# Patient Record
Sex: MALE | Race: BLACK OR AFRICAN AMERICAN | NOT HISPANIC OR LATINO | Employment: FULL TIME | URBAN - METROPOLITAN AREA
[De-identification: names, ages, dates, MRNs, and addresses within clinical notes are randomized per-mention and may not be internally consistent; named-entity substitution may affect disease eponyms.]

---

## 2023-03-27 PROBLEM — I10 ESSENTIAL HYPERTENSION: Status: ACTIVE | Noted: 2023-03-27

## 2023-03-27 RX ORDER — CHLORTHALIDONE 50 MG/1
1 TABLET ORAL DAILY
COMMUNITY
Start: 2017-11-14 | End: 2023-11-21

## 2023-03-30 RX ORDER — DIAZEPAM 5 MG/1
TABLET ORAL
COMMUNITY
End: 2023-11-21

## 2023-03-30 RX ORDER — IBUPROFEN 600 MG/1
TABLET ORAL
COMMUNITY

## 2023-04-01 ENCOUNTER — OFFICE VISIT (OUTPATIENT)
Dept: PRIMARY CARE | Facility: CLINIC | Age: 38
End: 2023-04-01
Payer: COMMERCIAL

## 2023-04-01 VITALS
BODY MASS INDEX: 40.49 KG/M2 | SYSTOLIC BLOOD PRESSURE: 180 MMHG | DIASTOLIC BLOOD PRESSURE: 112 MMHG | HEIGHT: 67 IN | WEIGHT: 258 LBS

## 2023-04-01 DIAGNOSIS — I10 HYPERTENSION, UNSPECIFIED TYPE: ICD-10-CM

## 2023-04-01 PROCEDURE — 3080F DIAST BP >= 90 MM HG: CPT | Performed by: INTERNAL MEDICINE

## 2023-04-01 PROCEDURE — 99204 OFFICE O/P NEW MOD 45 MIN: CPT | Performed by: INTERNAL MEDICINE

## 2023-04-01 PROCEDURE — 3077F SYST BP >= 140 MM HG: CPT | Performed by: INTERNAL MEDICINE

## 2023-04-02 NOTE — PROGRESS NOTES
OFFICE NOTE    NAME OF THE PATIENT: Román Gómez    YOB: 1985    CHIEF COMPLAINT:  This 37-year-old -American gentleman came to my office first time, I welcomed him.  There was no electricity, so we had to work around it.  He told me that he has increasing leg swelling.  He has a high blood pressure.  He was in the emergency room.  They were started him on  losartan and blood pressure still very high, he is concerned.  Palpitation.  Not feeling good.  He came here for follow-up on various conditions.  Legs swell up at the end of the day.  Edema.  He is not sure the reason.    PAST MEDICAL HISTORY:  Reviewed from EMR.  Newly diagnosed high blood pressure, overweight.    CURRENT MEDICATIONS:  Reviewed from EMR.  Losartan.    ALLERGIES:  Reviewed from EMR.  None.    SOCIAL HISTORY:  Reviewed from EMR.  He smokes a few cigarettes.  No history of alcohol or drug abuse.  Occupation, he is a .  Single.  Two children.    FAMILY HISTORY:  Reviewed from EMR.  Positive for hypertension.  High blood pressure in the family. Diabetes in the family.    IMMUNIZATION:  Tetanus within the last 10 years.    OPERATION:  ______.    REVIEW OF SYSTEMS:  No heart attack.  No stroke.  No diabetes.  No cancer.  All 12 systems reviewed and pertaining covered in history and physical.    PHYSICAL EXAMINATION  VITAL SIGNS:  As recorded and reviewed from EMR.  EYES:  The patient's sclerae white.  The pupils were equal and round.  ENT:  The patient's external ears were normal and the otoscopic examination was negative.  NECK:  Supple.  There were no palpable masses.  Thyroid was not enlarged and there were no carotid bruits.  RESPIRATORY:  The patient had normal inspirations and expirations.  The breath sounds were equal bilaterally and clear to auscultation.  CARDIOVASCULAR:  The patient had S1 normal, split S2 without obvious rubs, clicks, or murmurs.  GASTROINTESTINAL:  There was no hepatosplenomegaly.   "There were no palpable masses and no inguinal nodes.  LYMPHATIC:  The patient had no axillary, groin, or lymphadenopathy.  MUSCULOSKELETAL:  The patient had normal gait.  The joints appeared to be normal without evidence of deformity.  Grossly the muscles had good range of motion and were without effusion.  EXTREMITIES:  Legs had 2+ edema.  NEUROLOGIC:  The patient had normal cranial nerves.  The reflexes, sensory, and motor examination were grossly within normal limits.  PSYCHIATRIC:  The patient had normal judgment and insight.  The patient was oriented to person, place, and time, and had no obvious mood defect including depression, anxiety, and/or agitation.    LAB WORK:  Laboratory testing ordered.    ASSESSMENT AND PLAN:  Edema.  It could be because of the hypertension.  I will monitor.  I ordered echocardiogram for heart.   Lasix and potassium given.  Hypertension.  Make losartan 100 mg.  I added amlodipine.  Blood pressure is really high.  Cholesterol.  Cholesterol check ordered.  Overweight and family history of diabetes.  I ordered fasting sugar, hemoglobin A1c.  Palpitations as well as edema, hypertension.  I ordered 2D echo of the heart to check for HOCM, questionable murmur.  Electricity is not here today, power outage.  So I personally called in prescription to his [facility] pharmacy.  Blood work ordered.  I shall see him in a week after testing.      Kindly review this note in conjunction with EMR.   Subjective   Patient ID: Román Gómez is a 37 y.o. male who presents for New Patient Visit.      HPI    Review of Systems    Objective   BP (!) 180/112   Ht 1.689 m (5' 6.5\")   Wt 117 kg (258 lb)   BMI 41.02 kg/m²       Physical Exam    Assessment/Plan   Problem List Items Addressed This Visit    None  Visit Diagnoses       Hypertension, unspecified type                  "

## 2023-05-25 ENCOUNTER — OFFICE VISIT (OUTPATIENT)
Dept: PRIMARY CARE | Facility: CLINIC | Age: 38
End: 2023-05-25
Payer: COMMERCIAL

## 2023-05-25 VITALS — HEIGHT: 67 IN | BODY MASS INDEX: 38.45 KG/M2 | WEIGHT: 245 LBS

## 2023-05-25 DIAGNOSIS — I10 HYPERTENSION, UNSPECIFIED TYPE: ICD-10-CM

## 2023-05-25 DIAGNOSIS — E03.9 HYPOTHYROIDISM, UNSPECIFIED TYPE: ICD-10-CM

## 2023-05-25 DIAGNOSIS — I50.9 CONGESTIVE HEART FAILURE, UNSPECIFIED HF CHRONICITY, UNSPECIFIED HEART FAILURE TYPE (MULTI): ICD-10-CM

## 2023-05-25 DIAGNOSIS — E56.9 VITAMIN DEFICIENCY: ICD-10-CM

## 2023-05-25 DIAGNOSIS — I83.899 COMPLICATED VARICOSE VEINS: ICD-10-CM

## 2023-05-25 PROCEDURE — 99214 OFFICE O/P EST MOD 30 MIN: CPT | Performed by: INTERNAL MEDICINE

## 2023-05-25 RX ORDER — AMLODIPINE BESYLATE 10 MG/1
10 TABLET ORAL DAILY
Qty: 90 TABLET | Refills: 1 | Status: SHIPPED | OUTPATIENT
Start: 2023-05-25

## 2023-05-25 RX ORDER — AMLODIPINE BESYLATE 10 MG/1
10 TABLET ORAL DAILY
COMMUNITY
Start: 2023-04-28 | End: 2023-05-25 | Stop reason: SDUPTHER

## 2023-05-25 RX ORDER — FUROSEMIDE 40 MG/1
40 TABLET ORAL DAILY
COMMUNITY
Start: 2023-04-28 | End: 2023-06-30

## 2023-05-25 RX ORDER — METOPROLOL SUCCINATE 25 MG/1
25 TABLET, EXTENDED RELEASE ORAL DAILY
Qty: 90 TABLET | Refills: 1 | Status: SHIPPED | OUTPATIENT
Start: 2023-05-25 | End: 2023-11-21

## 2023-05-25 RX ORDER — LOSARTAN POTASSIUM 100 MG/1
100 TABLET ORAL DAILY
Qty: 90 TABLET | Refills: 1 | Status: SHIPPED | OUTPATIENT
Start: 2023-05-25 | End: 2023-12-15

## 2023-05-25 RX ORDER — LOSARTAN POTASSIUM 100 MG/1
100 TABLET ORAL DAILY
COMMUNITY
Start: 2023-04-28 | End: 2023-05-25 | Stop reason: SDUPTHER

## 2023-05-25 RX ORDER — POTASSIUM CHLORIDE 1500 MG/1
20 TABLET, EXTENDED RELEASE ORAL DAILY
COMMUNITY
Start: 2023-04-28

## 2023-05-25 NOTE — PROGRESS NOTES
OFFICE NOTE    NAME OF THE PATIENT: Román Gómez     YOB: 1985    CHIEF COMPLAINT:  This gentleman is not happy because there was no paper on blood work, so he did not do blood work.  He does not like his potassium pill, too big.  Back of both legs he he has swelling and tenderness, not getting any better, dilated veins, he gets tingling and numbness.  He came for follow-up on various conditions.  Appetite and weight are okay.  No problem.    PAST MEDICAL HISTORY:  Reviewed on EMR, unchanged.    CURRENT MEDICATIONS:  Reviewed on EMR, unchanged.  According to him, he is taking losartan, furosemide, potassium, amlodipine.    ALLERGIES:  Reviewed on EMR, unchanged.    SOCIAL HISTORY:  Reviewed on EMR, unchanged.  He does not smoke, does not drink alcohol.    FAMILY HISTORY:  Reviewed on EMR, unchanged.    REVIEW OF SYSTEMS:  All 12 systems reviewed and pertaining covered in history and physical.    PHYSICAL EXAMINATION  VITAL SIGNS:  As recorded and reviewed from EMR.  RESPIRATORY:  The patient had normal inspirations and expirations.  The breath sounds were equal bilaterally and clear to auscultation.  CARDIOVASCULAR:  The patient had S1 normal, split S2 without obvious rubs, clicks, or murmurs.    GASTROINTESTINAL:  There was no hepatosplenomegaly.  There were no palpable masses and no inguinal nodes.  EXTREMITIES:  Legs had no edema.  Back of the legs looks like varicose veins.  NEUROLOGIC:  The patient had normal cranial nerves.  The reflexes, sensory, and motor examination were grossly within normal limits.    LAB WORK:  Laboratory testing discussed.    ASSESSMENT AND PLAN:  Varicose veins.  We will do ultrasound to rule out DVT.  Referred to Vascular Medicine, stockings.  Question of heart failure, leg edema.  Ejection fraction ______.  Diastolic dysfunction starting.  Metoprolol started.  Hypertension.  Losartan, amlodipine.  Edema.  Lasix, potassium.  ______ (Pre-diabetic).  Hemoglobin  "A1c.  Arthritis.  Blood work ordered.  I shall see him back in about a week to 10 days after testing.  We will do blood work today.    Kindly review this note in conjunction with EMR.     Subjective   Patient ID: Román Gómez is a 37 y.o. male who presents for Follow-up and Med Refill.      HPI    Review of Systems    Objective   Ht 1.702 m (5' 7\")   Wt 111 kg (245 lb)   BMI 38.37 kg/m²       Physical Exam    Assessment/Plan   Problem List Items Addressed This Visit    None        "

## 2023-06-29 DIAGNOSIS — R60.1 GENERALIZED EDEMA: Primary | ICD-10-CM

## 2023-06-30 RX ORDER — FUROSEMIDE 40 MG/1
TABLET ORAL
Qty: 30 TABLET | Refills: 2 | Status: SHIPPED | OUTPATIENT
Start: 2023-06-30 | End: 2023-07-22

## 2023-07-22 DIAGNOSIS — R60.1 GENERALIZED EDEMA: ICD-10-CM

## 2023-07-22 RX ORDER — FUROSEMIDE 40 MG/1
TABLET ORAL
Qty: 30 TABLET | Refills: 2 | Status: SHIPPED | OUTPATIENT
Start: 2023-07-22 | End: 2023-12-23

## 2023-09-26 PROBLEM — M79.89 LEG SWELLING: Status: ACTIVE | Noted: 2023-09-26

## 2023-09-26 PROBLEM — L73.2 HIDRADENITIS SUPPURATIVA: Status: ACTIVE | Noted: 2018-05-01

## 2023-09-26 PROBLEM — L02.92 FURUNCLE: Status: ACTIVE | Noted: 2023-09-26

## 2023-09-26 PROBLEM — I83.893 SYMPTOMATIC VARICOSE VEINS OF BOTH LOWER EXTREMITIES: Status: ACTIVE | Noted: 2023-09-26

## 2023-09-26 PROBLEM — A63.0 ANOGENITAL (VENEREAL) WARTS: Status: ACTIVE | Noted: 2018-05-01

## 2023-09-26 PROBLEM — L30.9 DERMATITIS: Status: ACTIVE | Noted: 2023-09-26

## 2023-10-02 ENCOUNTER — TELEPHONE (OUTPATIENT)
Dept: DERMATOLOGY | Facility: CLINIC | Age: 38
End: 2023-10-02
Payer: COMMERCIAL

## 2023-10-02 DIAGNOSIS — L30.9 DERMATITIS: Primary | ICD-10-CM

## 2023-10-02 RX ORDER — CLOBETASOL PROPIONATE 0.5 MG/G
CREAM TOPICAL 2 TIMES DAILY PRN
Qty: 60 G | Refills: 1 | Status: SHIPPED | OUTPATIENT
Start: 2023-10-02 | End: 2023-10-16

## 2023-10-02 NOTE — TELEPHONE ENCOUNTER
Patient called left message that he is out of his cream and the amount he is prescribed does not last he needs a larger amount.  Patient states he is only applying once a day to try to make the medication last longer.  Please advise

## 2023-10-31 ENCOUNTER — OFFICE VISIT (OUTPATIENT)
Dept: DERMATOLOGY | Facility: CLINIC | Age: 38
End: 2023-10-31
Payer: COMMERCIAL

## 2023-10-31 DIAGNOSIS — L30.9 DERMATITIS: ICD-10-CM

## 2023-10-31 DIAGNOSIS — L20.9 ATOPIC DERMATITIS, UNSPECIFIED TYPE: Primary | ICD-10-CM

## 2023-10-31 PROCEDURE — 3078F DIAST BP <80 MM HG: CPT | Performed by: NURSE PRACTITIONER

## 2023-10-31 PROCEDURE — 3074F SYST BP LT 130 MM HG: CPT | Performed by: NURSE PRACTITIONER

## 2023-10-31 PROCEDURE — 99213 OFFICE O/P EST LOW 20 MIN: CPT | Performed by: NURSE PRACTITIONER

## 2023-10-31 RX ORDER — TRIAMCINOLONE ACETONIDE 1 MG/G
CREAM TOPICAL 2 TIMES DAILY PRN
Qty: 453.6 G | Refills: 1 | Status: SHIPPED | OUTPATIENT
Start: 2023-10-31 | End: 2024-03-19

## 2023-10-31 RX ORDER — PREDNISONE 10 MG/1
20 TABLET ORAL EVERY MORNING
Qty: 28 TABLET | Refills: 0 | Status: SHIPPED | OUTPATIENT
Start: 2023-10-31 | End: 2023-11-21

## 2023-10-31 ASSESSMENT — DERMATOLOGY QUALITY OF LIFE (QOL) ASSESSMENT
ARE THERE EXCLUSIONS OR EXCEPTIONS FOR THE QUALITY OF LIFE ASSESSMENT: NO
DATE THE QUALITY-OF-LIFE ASSESSMENT WAS COMPLETED: 66778

## 2023-10-31 ASSESSMENT — ITCH NUMERIC RATING SCALE: HOW SEVERE IS YOUR ITCHING?: 6

## 2023-10-31 NOTE — PROGRESS NOTES
Subjective     Román Gómez is a 38 y.o. male who presents for the following: Rash.   Last seen by Debbie anne September 6, 2023 6-week follow-up today for rash affecting hands feet back chest and legs.  Rash has been present for many months patient has a history of atopic dermatitis has used in the past CeraVe lotion Eucerin lotion baby oil patient has a family history of psoriasis patient recently started metoprolol for hypertension and is now well controlled.  At last visit patient had no groin or scalp involvement.  However today patient states that his rash has now begun to spread into the scalp and other areas on the body.  Patient states that the areas on lower legs have cleared up some but clobetasol cream that was prescribed does not last the entire month.  Patient using clobetasol scalp solution as well for new scalp involvement        Review of Systems:  No other skin or systemic complaints other than what is documented elsewhere in the note.    The following portions of the chart were reviewed this encounter and updated as appropriate:       Skin Cancer History  No skin cancer on file.    Specialty Problems          Dermatology Problems    Hidradenitis suppurativa    Dermatitis     Past Medical History:  Román Gómez  has no past medical history on file.    Past Surgical History:  Román Gómez  has no past surgical history on file.    Family History:  Patient family history is not on file.    Social History:  Román Gómez  has no history on file for tobacco use, alcohol use, and drug use.    Allergies:  Patient has no known allergies.    Current Medications / CAM's:    Current Outpatient Medications:     amLODIPine (Norvasc) 10 mg tablet, Take 1 tablet (10 mg) by mouth once daily., Disp: 90 tablet, Rfl: 1    chlorthalidone (Hygroton) 50 mg tablet, Take 1 tablet (50 mg) by mouth once daily., Disp: , Rfl:     diazePAM (Valium) 5 mg tablet, , Disp: , Rfl:     furosemide (Lasix) 40 mg tablet,  TAKE 1 TABLET BY MOUTH EVERY DAY, Disp: 30 tablet, Rfl: 2    ibuprofen 600 mg tablet, , Disp: , Rfl:     losartan (Cozaar) 100 mg tablet, Take 1 tablet (100 mg) by mouth once daily., Disp: 90 tablet, Rfl: 1    metoprolol succinate XL (Toprol-XL) 25 mg 24 hr tablet, Take 1 tablet (25 mg) by mouth once daily. Do not crush or chew., Disp: 90 tablet, Rfl: 1    potassium chloride CR (K-Tab) 20 mEq ER tablet, Take 1 tablet (20 mEq) by mouth once daily., Disp: , Rfl:     predniSONE (Deltasone) 10 mg tablet, Take 2 tablets (20 mg) by mouth once daily in the morning for 14 days., Disp: 28 tablet, Rfl: 0    triamcinolone (Kenalog) 0.1 % cream, Apply topically 2 times a day as needed for rash., Disp: 453.6 g, Rfl: 1     Objective   Well appearing patient in no apparent distress; mood and affect are within normal limits.    A full examination was performed including scalp, head, eyes, ears, nose, lips, neck, chest, axillae, abdomen, back, buttocks, bilateral upper extremities, bilateral lower extremities, hands, feet, fingers, toes, fingernails, and toenails. All findings within normal limits unless otherwise noted below.    Assessment/Plan   1. Atopic dermatitis, unspecified type    Related Medications  triamcinolone (Kenalog) 0.1 % cream  Apply topically 2 times a day as needed for rash.    predniSONE (Deltasone) 10 mg tablet  Take 2 tablets (20 mg) by mouth once daily in the morning for 14 days.    2. Dermatitis  Chest - Medial (Center), Head - Anterior (Face), Left Abdomen (side) - Lower, Left Abdomen (side) - Upper, Left Axilla, Left Breast, Left Hand - Anterior, Left Inframammary Fold, Left Lower Leg - Anterior, Left Upper Arm - Anterior, Neck - Anterior, Right Abdomen (side) - Lower, Right Axilla, Right Breast, Right Inguinal Area, Right Knee - Anterior, Right Lower Leg - Anterior, Right Suprapubic Area, Right Thigh - Anterior, Right Upper Arm - Anterior  Scaly erythematous papules and plaques with edema and vesiculation  in a distribution consistent with contact.  Evidence of open excoriations and postinflammatory hyperpigmentation from previously resolved spots.  Patient is only intermittently using clobetasol 0.05% as well as helpful he runs out of the cream rapidly given his body surface area.  On exam today body surface area estimated approximately 40%.    Plan: Counseling.  I counseled the patient regarding the following:  Skin care: Patient should bathe using lukewarm water with a mild cleanser and moisturize immediately after. Emollients should be applied at least  2-3 times daily. Avoid scented detergents or fabric softeners. Keep fingernai ls short. Avoid excessive hand washing.  Expectations : The patient is aware that eczema is chronic in nature and can improve with moisturizers and topical steroids and worsen with stress,  scented soaps, detergents, scratching, dry skin, changes in weather and skin infections.  Contact office if:  Eczema worsens or fails to improve despite several weeks of treatment; patient develops skin infections (such as:yellow honey  colored crusts or cold sores).    PLAN:  Discussed differential today likely atopic dermatitis given the patient has had a history of eczema his entire life.  We will reconsider biopsy however patient has been treating this rash given its itch with clobetasol over the course of the last several weeks  Plan to start prednisone 20 mg for 2 weeks  Start triamcinolone 0.1% cream twice daily on his body throughout

## 2023-11-21 ENCOUNTER — OFFICE VISIT (OUTPATIENT)
Dept: CARDIOLOGY | Facility: CLINIC | Age: 38
End: 2023-11-21
Payer: COMMERCIAL

## 2023-11-21 ENCOUNTER — LAB (OUTPATIENT)
Dept: LAB | Facility: LAB | Age: 38
End: 2023-11-21
Payer: COMMERCIAL

## 2023-11-21 VITALS
SYSTOLIC BLOOD PRESSURE: 157 MMHG | OXYGEN SATURATION: 95 % | HEIGHT: 67 IN | WEIGHT: 257.2 LBS | BODY MASS INDEX: 40.37 KG/M2 | DIASTOLIC BLOOD PRESSURE: 93 MMHG | HEART RATE: 101 BPM

## 2023-11-21 DIAGNOSIS — I50.9 CONGESTIVE HEART FAILURE, UNSPECIFIED HF CHRONICITY, UNSPECIFIED HEART FAILURE TYPE (MULTI): ICD-10-CM

## 2023-11-21 DIAGNOSIS — I10 HYPERTENSION, UNSPECIFIED TYPE: Primary | ICD-10-CM

## 2023-11-21 DIAGNOSIS — E03.9 HYPOTHYROIDISM, UNSPECIFIED TYPE: ICD-10-CM

## 2023-11-21 DIAGNOSIS — I10 HYPERTENSION, UNSPECIFIED TYPE: ICD-10-CM

## 2023-11-21 DIAGNOSIS — E56.9 VITAMIN DEFICIENCY: ICD-10-CM

## 2023-11-21 LAB
25(OH)D3 SERPL-MCNC: 6 NG/ML (ref 30–100)
ALBUMIN SERPL BCP-MCNC: 4 G/DL (ref 3.4–5)
ALP SERPL-CCNC: 84 U/L (ref 33–120)
ALT SERPL W P-5'-P-CCNC: 32 U/L (ref 10–52)
ANION GAP SERPL CALC-SCNC: 15 MMOL/L (ref 10–20)
APPEARANCE UR: ABNORMAL
AST SERPL W P-5'-P-CCNC: 60 U/L (ref 9–39)
BASOPHILS # BLD AUTO: 0.04 X10*3/UL (ref 0–0.1)
BASOPHILS NFR BLD AUTO: 0.5 %
BILIRUB SERPL-MCNC: 0.3 MG/DL (ref 0–1.2)
BILIRUB UR STRIP.AUTO-MCNC: NEGATIVE MG/DL
BUN SERPL-MCNC: 13 MG/DL (ref 6–23)
CALCIUM SERPL-MCNC: 9.3 MG/DL (ref 8.6–10.6)
CHLORIDE SERPL-SCNC: 107 MMOL/L (ref 98–107)
CHOLEST SERPL-MCNC: 255 MG/DL (ref 0–199)
CHOLESTEROL/HDL RATIO: 3.5
CO2 SERPL-SCNC: 29 MMOL/L (ref 21–32)
COLOR UR: YELLOW
CREAT SERPL-MCNC: 1.1 MG/DL (ref 0.5–1.3)
EOSINOPHIL # BLD AUTO: 0.1 X10*3/UL (ref 0–0.7)
EOSINOPHIL NFR BLD AUTO: 1.2 %
ERYTHROCYTE [DISTWIDTH] IN BLOOD BY AUTOMATED COUNT: 13 % (ref 11.5–14.5)
GFR SERPL CREATININE-BSD FRML MDRD: 88 ML/MIN/1.73M*2
GLUCOSE SERPL-MCNC: 90 MG/DL (ref 74–99)
GLUCOSE UR STRIP.AUTO-MCNC: NEGATIVE MG/DL
HCT VFR BLD AUTO: 41.6 % (ref 41–52)
HDLC SERPL-MCNC: 72.3 MG/DL
HGB BLD-MCNC: 13.7 G/DL (ref 13.5–17.5)
IMM GRANULOCYTES # BLD AUTO: 0.13 X10*3/UL (ref 0–0.7)
IMM GRANULOCYTES NFR BLD AUTO: 1.5 % (ref 0–0.9)
KETONES UR STRIP.AUTO-MCNC: NEGATIVE MG/DL
LDLC SERPL CALC-MCNC: 143 MG/DL
LEUKOCYTE ESTERASE UR QL STRIP.AUTO: NEGATIVE
LYMPHOCYTES # BLD AUTO: 2.76 X10*3/UL (ref 1.2–4.8)
LYMPHOCYTES NFR BLD AUTO: 32.6 %
MCH RBC QN AUTO: 37.3 PG (ref 26–34)
MCHC RBC AUTO-ENTMCNC: 32.9 G/DL (ref 32–36)
MCV RBC AUTO: 113 FL (ref 80–100)
MONOCYTES # BLD AUTO: 0.9 X10*3/UL (ref 0.1–1)
MONOCYTES NFR BLD AUTO: 10.6 %
MUCOUS THREADS #/AREA URNS AUTO: NORMAL /LPF
NEUTROPHILS # BLD AUTO: 4.53 X10*3/UL (ref 1.2–7.7)
NEUTROPHILS NFR BLD AUTO: 53.6 %
NITRITE UR QL STRIP.AUTO: NEGATIVE
NON HDL CHOLESTEROL: 183 MG/DL (ref 0–149)
NRBC BLD-RTO: 0 /100 WBCS (ref 0–0)
PH UR STRIP.AUTO: 5 [PH]
PLATELET # BLD AUTO: 236 X10*3/UL (ref 150–450)
POTASSIUM SERPL-SCNC: 4.1 MMOL/L (ref 3.5–5.3)
PROT SERPL-MCNC: 7.5 G/DL (ref 6.4–8.2)
PROT UR STRIP.AUTO-MCNC: ABNORMAL MG/DL
RBC # BLD AUTO: 3.67 X10*6/UL (ref 4.5–5.9)
RBC # UR STRIP.AUTO: NEGATIVE /UL
RBC #/AREA URNS AUTO: NORMAL /HPF
SODIUM SERPL-SCNC: 147 MMOL/L (ref 136–145)
SP GR UR STRIP.AUTO: 1.02
SQUAMOUS #/AREA URNS AUTO: NORMAL /HPF
TRIGL SERPL-MCNC: 201 MG/DL (ref 0–149)
TSH SERPL-ACNC: 1.14 MIU/L (ref 0.44–3.98)
UROBILINOGEN UR STRIP.AUTO-MCNC: 2 MG/DL
VIT B12 SERPL-MCNC: 230 PG/ML (ref 211–911)
VLDL: 40 MG/DL (ref 0–40)
WBC # BLD AUTO: 8.5 X10*3/UL (ref 4.4–11.3)
WBC #/AREA URNS AUTO: NORMAL /HPF

## 2023-11-21 PROCEDURE — 82306 VITAMIN D 25 HYDROXY: CPT

## 2023-11-21 PROCEDURE — 82607 VITAMIN B-12: CPT

## 2023-11-21 PROCEDURE — 3077F SYST BP >= 140 MM HG: CPT | Performed by: NURSE PRACTITIONER

## 2023-11-21 PROCEDURE — 85025 COMPLETE CBC W/AUTO DIFF WBC: CPT

## 2023-11-21 PROCEDURE — 81001 URINALYSIS AUTO W/SCOPE: CPT

## 2023-11-21 PROCEDURE — 80061 LIPID PANEL: CPT

## 2023-11-21 PROCEDURE — 84443 ASSAY THYROID STIM HORMONE: CPT

## 2023-11-21 PROCEDURE — 3080F DIAST BP >= 90 MM HG: CPT | Performed by: NURSE PRACTITIONER

## 2023-11-21 PROCEDURE — 99214 OFFICE O/P EST MOD 30 MIN: CPT | Performed by: NURSE PRACTITIONER

## 2023-11-21 PROCEDURE — 80053 COMPREHEN METABOLIC PANEL: CPT

## 2023-11-21 PROCEDURE — 36415 COLL VENOUS BLD VENIPUNCTURE: CPT

## 2023-11-21 ASSESSMENT — ENCOUNTER SYMPTOMS
CONSTITUTIONAL NEGATIVE: 1
CARDIOVASCULAR NEGATIVE: 1
OCCASIONAL FEELINGS OF UNSTEADINESS: 0
LOSS OF SENSATION IN FEET: 0
DEPRESSION: 0
MUSCULOSKELETAL NEGATIVE: 1
GASTROINTESTINAL NEGATIVE: 1
RESPIRATORY NEGATIVE: 1
NEUROLOGICAL NEGATIVE: 1

## 2023-11-21 ASSESSMENT — PAIN SCALES - GENERAL: PAINLEVEL: 0-NO PAIN

## 2023-11-21 NOTE — PROGRESS NOTES
"Chief Complaint:   No chief complaint on file.    History Of Present Illness:    .Mr Gómez is here today in follow up.  Denies chest pain, sob, palpitations or pedal edema. He was diagnosed by dermatology with psoriasis and his legs are much better. Has not taken losartan yet today.           Last Recorded Vitals:  Blood pressure (!) 157/93, pulse 101, height 1.702 m (5' 7\"), weight 117 kg (257 lb 3.2 oz), SpO2 95 %.     Past Medical History:  No past medical history on file.     Past Surgical History:  No past surgical history on file.    Social History:  Social History     Socioeconomic History    Marital status: Single     Spouse name: None    Number of children: None    Years of education: None    Highest education level: None   Occupational History    None   Tobacco Use    Smoking status: Every Day     Types: Cigars    Smokeless tobacco: Never   Substance and Sexual Activity    Alcohol use: None    Drug use: None    Sexual activity: None   Other Topics Concern    None   Social History Narrative    None     Social Determinants of Health     Financial Resource Strain: Not on file   Food Insecurity: Not on file   Transportation Needs: Not on file   Physical Activity: Not on file   Stress: Not on file   Social Connections: Not on file   Intimate Partner Violence: Not on file   Housing Stability: Not on file       Family History:  No family history on file.      Allergies:  Patient has no known allergies.    Outpatient Medications:  Current Outpatient Medications   Medication Sig Dispense Refill    amLODIPine (Norvasc) 10 mg tablet Take 1 tablet (10 mg) by mouth once daily. 90 tablet 1    chlorthalidone (Hygroton) 50 mg tablet Take 1 tablet (50 mg) by mouth once daily.      diazePAM (Valium) 5 mg tablet       furosemide (Lasix) 40 mg tablet TAKE 1 TABLET BY MOUTH EVERY DAY 30 tablet 2    ibuprofen 600 mg tablet       losartan (Cozaar) 100 mg tablet Take 1 tablet (100 mg) by mouth once daily. 90 tablet 1    " "metoprolol succinate XL (Toprol-XL) 25 mg 24 hr tablet Take 1 tablet (25 mg) by mouth once daily. Do not crush or chew. 90 tablet 1    potassium chloride CR (K-Tab) 20 mEq ER tablet Take 1 tablet (20 mEq) by mouth once daily.      triamcinolone (Kenalog) 0.1 % cream Apply topically 2 times a day as needed for rash. 453.6 g 1     No current facility-administered medications for this visit.        Physical Exam:  Cardiovascular:      PMI at left midclavicular line. Normal rate. Regular rhythm. Normal S1. Normal S2.       Murmurs: There is no murmur.      No gallop.  No click. No rub.   Pulses:     Intact distal pulses.   Edema:     Peripheral edema absent.         ROS:  Review of Systems   Constitutional: Negative.   Cardiovascular: Negative.    Respiratory: Negative.     Skin: Negative.    Musculoskeletal: Negative.    Gastrointestinal: Negative.    Genitourinary: Negative.    Neurological: Negative.           Last Labs:  CBC -  Lab Results   Component Value Date    WBC 10.5 03/27/2023    HGB 15.4 03/27/2023    HCT 41.9 03/27/2023     (H) 03/27/2023     03/27/2023       CMP -  Lab Results   Component Value Date    CALCIUM 9.4 03/27/2023    PROT 8.2 03/27/2023    ALBUMIN 4.0 03/27/2023    AST 94 (H) 03/27/2023    ALT 53 (H) 03/27/2023    ALKPHOS 93 03/27/2023    BILITOT 0.9 03/27/2023       LIPID PANEL -   No results found for: \"CHOL\", \"TRIG\", \"HDL\", \"CHHDL\", \"LDLF\", \"VLDL\", \"NHDL\"    RENAL FUNCTION PANEL -   Lab Results   Component Value Date    GLUCOSE 77 03/27/2023     03/27/2023    K 3.6 03/27/2023    CL 99 03/27/2023    CO2 26 03/27/2023    ANIONGAP 19 03/27/2023    BUN 8 03/27/2023    CREATININE 0.96 03/27/2023    GFRMALE >90 03/27/2023    CALCIUM 9.4 03/27/2023    ALBUMIN 4.0 03/27/2023        Lab Results   Component Value Date    BNP 87 03/27/2023    HGBA1C 5.8 11/14/2017         Assessment/Plan   Problem List Items Addressed This Visit    None   Hypertension.  Adequately controlled on " current medication.    Echo 04/2023  CONCLUSIONS:  1. Left ventricular systolic function is normal with a 55-60% estimated ejection fraction.  2. Mild to moderate left ventriculary hypertrophy.    Domonique Infante, APRN-CNP

## 2023-11-22 DIAGNOSIS — E78.5 HYPERLIPIDEMIA, UNSPECIFIED HYPERLIPIDEMIA TYPE: Primary | ICD-10-CM

## 2023-11-22 DIAGNOSIS — R06.81 APNEA: Primary | ICD-10-CM

## 2023-12-23 DIAGNOSIS — L21.9 SEBORRHEIC DERMATITIS: Primary | ICD-10-CM

## 2023-12-26 RX ORDER — CLOBETASOL PROPIONATE 0.5 MG/G
CREAM TOPICAL
Qty: 120 G | Refills: 1 | Status: SHIPPED | OUTPATIENT
Start: 2023-12-26

## 2024-03-18 DIAGNOSIS — L20.9 ATOPIC DERMATITIS, UNSPECIFIED TYPE: ICD-10-CM

## 2024-03-18 DIAGNOSIS — R60.1 GENERALIZED EDEMA: ICD-10-CM

## 2024-03-19 RX ORDER — TRIAMCINOLONE ACETONIDE 1 MG/G
CREAM TOPICAL 2 TIMES DAILY PRN
Qty: 454 G | Refills: 1 | Status: SHIPPED | OUTPATIENT
Start: 2024-03-19

## 2024-03-19 RX ORDER — CLOBETASOL PROPIONATE 0.46 MG/ML
SOLUTION TOPICAL
Qty: 50 ML | Refills: 2 | Status: SHIPPED | OUTPATIENT
Start: 2024-03-19

## 2024-03-19 RX ORDER — FUROSEMIDE 40 MG/1
TABLET ORAL
Qty: 90 TABLET | Refills: 0 | OUTPATIENT
Start: 2024-03-19

## 2024-03-28 ENCOUNTER — OFFICE VISIT (OUTPATIENT)
Dept: SLEEP MEDICINE | Facility: CLINIC | Age: 39
End: 2024-03-28
Payer: COMMERCIAL

## 2024-03-28 DIAGNOSIS — R53.83 FATIGUE, UNSPECIFIED TYPE: ICD-10-CM

## 2024-03-28 DIAGNOSIS — G47.10 HYPERSOMNIA: ICD-10-CM

## 2024-03-28 DIAGNOSIS — Z72.821 INADEQUATE SLEEP HYGIENE: ICD-10-CM

## 2024-03-28 DIAGNOSIS — G47.9 SLEEP DISTURBANCE: ICD-10-CM

## 2024-03-28 DIAGNOSIS — F17.200 CURRENT SMOKER: ICD-10-CM

## 2024-03-28 DIAGNOSIS — R06.83 SNORING: ICD-10-CM

## 2024-03-28 DIAGNOSIS — G47.30 SLEEP-DISORDERED BREATHING: Primary | ICD-10-CM

## 2024-03-28 DIAGNOSIS — G47.10 EXCESSIVE SLEEPINESS: ICD-10-CM

## 2024-03-28 DIAGNOSIS — I10 ESSENTIAL HYPERTENSION: ICD-10-CM

## 2024-03-28 DIAGNOSIS — E66.01 MORBID OBESITY (MULTI): ICD-10-CM

## 2024-03-28 PROCEDURE — 99204 OFFICE O/P NEW MOD 45 MIN: CPT

## 2024-03-28 NOTE — PATIENT INSTRUCTIONS
It was a pleasure meeting you today Román Gómez     As we discussed today in clinic:    1. Do in-home sleep testing.    2. Encouraged to lose weight.   3. Remember, don't drive when sleepy.   4. Stop smoking at least 3 hrs prior to bedtime    Education handouts given: Sleep Study Information    Please follow-up in 4 - 6 weeks after testing    ALWAYS BRING YOUR CPAP / BIPAP WITH YOU TO EVERY APPOINTMENT!  THANKS    FOR QUESTIONS AND CONCERNS:   1. In case of problems with machine or mask interface, please contact your DME company first. [employer] is the company that provides you the machine and/or CPAP supplies. If [employer] if your DME, you can reach them at [phone] or [employer] ([employer]) [phone].  2. For SLEEP STUDY appointments, please call [phone] ([facility]) or [phone] / [phone] ([facility]) or any other  Sleep Lab location please call [phone]  3. For MEDICAL QUESTIONS, MEDICATION REFILLS, or CLINIC APPOINTMENT SCHEDULING, please call [phone] and my practice lead Suzanne would gladly assist you with any concerns.   4. In the event that you are running more than 10 minutes late to your appointment or 5 minutes to virtual, I will kindly ask you to reschedule.    Here at [facility], we wish you a restful sleep!

## 2024-03-28 NOTE — LETTER
Your Provider has ordered you a sleep study.  The process for a home sleep study is as follows:    HOME SLEEP STUDY    Your test was ordered today. It will usually take 2 - 3 business days for Insurance to approve the order.     Once you test is approved it will be sent to the ordering sleep lab. When the sleep lab is notified of the new order, they will reach out to you to get you scheduled for a pick-up date for your Home Sleep Study Kit or notify you of when it will be mailed (CLEVMED).     They usually will reach out to you about 1 week after your test is ordered. Please contact the office at [phone] if you have not heard back from them in 2 weeks after you have seen your provider. See below for list of sleep lab contact numbers, if needed.     Sleep Lab Contact Information:   Main Phone Line (scheduling only): [phone]), option 3  Adult and Pediatric Locations  [facility] (6 years and older): [facility] - 4th floor ([facility]) After hours line: [phone]  [facility] (Main campus: All ages): [facility], 6th floor. After hours line: [phone]   [place] (5 years and older; younger considered on case-by-case basis): [address]; [address], [address]. Scheduling  After hours line: [phone]   [place] (6 years and older): [address]; [address]; [address]   [place] (6 years and older): [address] [facility], [address]  After hours line: [phone]   [place] (13 years and older) in [place]: [address], 2nd floor  After hours line: [phone]   [place] (13 year and older): [address]  After hours line: [phone]     Adult Only Locations:   [place] (18 years and older): [address] [facility] [address]nd [facility]   [place] (18 years and older): [address] [facility]; 4th floor  After hours line: [phone]  [facility] (18 years and older) at  [place]: [address]  After hours line: [phone]

## 2024-03-28 NOTE — PROGRESS NOTES
Patient: Román Gómez  : 1985 AGE: 38 y.o. SEX:male   MRN: [id number]   Provider: BOOKER Arroyo-[facility]   Service Date: 3/28/2024     PCP: Rhett Membreno MD   Referred by: Dr. Infante            [facility]  NEW PATIENT VISIT NOTE      Virtual Consent  An interactive audio and video telecommunication system which permits real time communications between the patient (at the originating site) and provider (at the distant site) was utilized to provide this telehealth service.   Verbal consent was requested and obtained from Román Gómez on this date, 24 for a telehealth visit.       PATIENT INFORMATION     The patient's referring provider is: No ref. provider found  The patient's Primary Care Provider: Rhett Membreno MD    HISTORY OF PRESENT ILLNESS     Patient ID: Román Gómez is a 38 y.o. male who presents to a [facility] for evaluation for evaluation for sleep apnea and Snoring    Patient is here alone today.  The patient has pertinent hx of sleep disordered breathing, snoring, EDS, fatigue, hypersomnia, obesity, HTN, and varicose veins .     24  Patient here today for evaluation for sleep apnea. He reports that he was recently evaluated by cardiology and was recommended for a sleep study. He has not completed the current order in the system. He reports that his blood pressure is okay managed currently with medication. Weight is fluctuating, especially recently with high dose steroids from derm. He is a current daily smoker of black and milds. He does report it is hard to breathe at night. Wife reports snoring. He does not usually wake up felling refreshed, however, he does also have to get up at 330 am for work but no significant difference either on days off. He does report waking up with dry mouth, dizziness and SOB occasionally at night, daytime fatigue,  sleepiness and unrefreshed by sleep.   I discussed testing options today with the patient today, HSAT vs In-lab. HSAT is reasonable as patient likely has YOVANNY based on history and exam and does not have any of the following comorbidities: Afib, CHF, seizures, neuromuscular weakness, hypoventilation, or significant COPD.   Will follow-up after sleep study results.       SLEEP HISTORY     SLEEP-WAKE SCHEDULE  Bedtime:  1030 - 1130 pm daily  Subjective sleep latency: few minutes  Difficulty falling asleep: No   Number of awakenings: x1 times per night   Falls back asleep in few minutes  Difficulty staying asleep: No   Final wake time:  330 or 630 am daily  Out of bed time: 330 or 630 am daily  Shift work: works 6a - 2p  Naps: denied  Feels rested after a nap: N/A  Average sleep duration (excluding naps): 6 - 7 hrs    SLEEP ENVIRONMENT  Sleep location: bed  Sleep status: sleeps with wife  Preferred sleep position: side  TV in bedroom: yes  Room is dark:  Yes  Room is quiet: No  Room is cool: Yes  Bed comfort: good    SLEEP HABITS   Activities before bedtime: TV  Activities in bed: TV  Clockwatching: No   Smoking: currently daily  ETOH:   Marijuana:   Caffeine: daily, occasionally energy drink  Sleep aids: denied    WEIGHT: fluctuating    Claustrophobia: No     STOP-BAN    REVIEW OF SYSTEMS     REVIEW OF SYSTEMS  Sleep-related ROS:  Night symptoms: POSITIVE for snoring and mouth breathing and NEGATIVE for witnessed apnea, wake up gasping and/or choking for air, nasal congestion , mouth breathing, night sweats during sleep, waking up with racing heart, heartburn or sour taste in mouth at night, nocturnal cough, and nocturia  Morning symptoms: POSITIVE for unrefreshing sleep and morning dry mouth and NEGATIVE for morning headache and morning sore throat  Daytime symptoms POSITIVE for excessive daytime sleepiness and fatigue and NEGATIVE for trouble remembering things in daytime, trouble staying focused in daytime,  irritability in daytime, and drowsy driving  Hypersomnia / narcolepsy symptoms: Patient denies symptoms of a hypersomnolence disorder such as sleep paralysis, sleep-related hallucinations, recurrent sleep attacks, automatic behaviors, and cataplexy.   Parasomnia symptoms: Patient denies symptoms of parasomnia.  Movements in sleep: Patient denies problematic movements in sleep.    RLS screen:  RLSSCREEN: - Sensations: Patient does not have unusual sensations in their extremities that cause an urge to move them     Naps:   No.  Fatigue: symptoms bothersome, but easily able to carry out all usual work/school/family activities      All other systems have been reviewed and are negative.      ALLERGIES AND MEDICATIONS     ALLERGIES  No Known Allergies    MEDICATIONS  Current Outpatient Medications   Medication Sig Dispense Refill    amLODIPine (Norvasc) 10 mg tablet Take 1 tablet (10 mg) by mouth once daily. 90 tablet 1    clobetasol (Temovate) 0.05 % cream 1 APPLICATION TWICE A DAY TOPICALLY TO AFFECTED AREAS ON BODY UNTIL CLEAR. AVOID USING ON FACE, GROIN, AND UNDERARMS. 120 g 1    clobetasol (Temovate) 0.05 % external solution 1 APPLICATION TWICE A DAY TOPICALLY TO SCALP 50 mL 2    furosemide (Lasix) 40 mg tablet TAKE 1 TABLET BY MOUTH EVERY DAY 90 tablet 0    ibuprofen 600 mg tablet       losartan (Cozaar) 100 mg tablet TAKE 1 TABLET BY MOUTH EVERY DAY 90 tablet 0    metoprolol succinate XL (Toprol-XL) 25 mg 24 hr tablet Take 1 tablet (25 mg) by mouth once daily. Do not crush or chew. 90 tablet 1    potassium chloride CR (K-Tab) 20 mEq ER tablet Take 1 tablet (20 mEq) by mouth once daily.      triamcinolone (Kenalog) 0.1 % cream APPLY TOPICALLY 2 TIMES A DAY AS NEEDED FOR RASH 454 g 1     No current facility-administered medications for this visit.         PAST HISTORY     PERTINENT PAST MEDICAL HISTORY: See HPI    PERTINENT PAST SURGICAL HISTORY for Sleep Medicine:  non-contributory    PERTINENT FAMILY HISTORY for  "Sleep Medicine:  loud snoring- father & grandfather    PERTINENT SOCIAL HISTORY:  He  reports that he has been smoking cigars. He has never used smokeless tobacco. No history on file for alcohol use and drug use. He currently lives with family and employed full-time    Active Problems, Allergy List, Medication List, and PMH/PSH/FH/Social Hx have been reviewed and reconciled in chart. No significant changes unless documented in the pertinent chart section. Updates made when necessary.       PHYSICAL EXAM     VITAL SIGNS: There were no vitals taken for this visit.    PREVIOUS WEIGHTS:  Wt Readings from Last 3 Encounters:   11/21/23 117 kg (257 lb 3.2 oz)   08/03/23 108 kg (237 lb 1 oz)   07/25/23 110 kg (242 lb 4 oz)       Limited telehealth examination  PHYSICAL EXAMINATION  General appearance: awake alert in NAD  Affect: normal  Skin: no rash on areas visible to the video  HEENT: Nasal congestion absent  Teeth: normal dentition  Lungs: no cough.  Extr: grossly normal on areas visible to the video  Neuro: normal speech           RESULTS/DATA     No results found for: \"IRON\", \"TRANSFERRIN\", \"IRONSAT\", \"TIBC\", \"FERRITIN\"    Bicarbonate   Date Value Ref Range Status   11/21/2023 29 21 - 32 mmol/L Final       PAP Adherence  Not applicable      ASSESSMENT/PLAN     Assessment/Plan   Román Gómez is a 38 y.o. male presents today in [facility] [place] [facility] with the following problems:    SLEEP DISORDERED BREATHING/SUSPECTED SLEEP APNEA and SNORING,   - Patient's risk factors for YOVANNY: BMI, gender, HTN, use of ETOH, current smoker, family history, and narrow crowded upper airway anatomy  - Current symptoms are: see HPI  - We discussed testing options today in clinic, HSAT vs In-lab  - HSAT is reasonable as patient likely has YOVANNY based on history and exam and does not have any of the following comorbidities: Afib, CHF, seizures, neuromuscular weakness, hypoventilation, or significant COPD.  - Discussed " YOVANNY pathophysiology, diagnostic testing (HST vs PSG), cardiometabolic and neurocognitive sequelae of untreated YOVANNY, and treatment options (PAP therapy, oral appliance, surgery, hypoglossal nerve stimulator called INSPIRE, etc).        INADEQUATE SLEEP HYGIENE / EXCESSIVE DAYTIME SLEEPINESS (EDS) / FATIGUE  + SLEEP DISTURBANCES + HYPERSOMNIA  - due to combination of poor sleep hygiene, untreated sleep apnea, nocturia, and current smoker . Per review of medication list, it does NOT appear medications are a contributing factor.  - discussed with patient good sleep hygiene        OBESITY  - BMI most recent 40.28  - no significant weight changes noted or reported  - Encouraged patient to lose weight with diet and exercise.   - Weight loss can help in the long term treatment of YOVANNY.  - Declined weight loss management assistance consult  - Defer management to PCP    HYPERTENSION  - BP unable to obtain d/t virtual visit  - patient reports well controlled with medication, denies any issues with management   - encouraged daily exercise with healthy diet for BP and YOVANNY management  - Defer management to PCP    SMOKING STATUS - ACTIVE  - current daily smoker  - not ready to quit  - encouraged smoking cessation at least 3 hrs prior to bedtime      All of patient's questions were answered. He verbalizes understanding and agreement with my assessment and plan.

## 2024-06-09 ENCOUNTER — HOSPITAL ENCOUNTER (EMERGENCY)
Facility: HOSPITAL | Age: 39
Discharge: HOME | End: 2024-06-09
Attending: EMERGENCY MEDICINE
Payer: COMMERCIAL

## 2024-06-09 ENCOUNTER — APPOINTMENT (OUTPATIENT)
Dept: RADIOLOGY | Facility: HOSPITAL | Age: 39
End: 2024-06-09
Payer: COMMERCIAL

## 2024-06-09 VITALS
HEIGHT: 67 IN | DIASTOLIC BLOOD PRESSURE: 72 MMHG | RESPIRATION RATE: 18 BRPM | TEMPERATURE: 97.7 F | SYSTOLIC BLOOD PRESSURE: 129 MMHG | OXYGEN SATURATION: 98 % | HEART RATE: 82 BPM | WEIGHT: 260 LBS | BODY MASS INDEX: 40.81 KG/M2

## 2024-06-09 DIAGNOSIS — S43.002A ACQUIRED SUBLUXATION OF LEFT SHOULDER, INITIAL ENCOUNTER: Primary | ICD-10-CM

## 2024-06-09 PROCEDURE — 73030 X-RAY EXAM OF SHOULDER: CPT | Mod: LT

## 2024-06-09 PROCEDURE — 99283 EMERGENCY DEPT VISIT LOW MDM: CPT | Performed by: EMERGENCY MEDICINE

## 2024-06-09 PROCEDURE — 73030 X-RAY EXAM OF SHOULDER: CPT | Mod: LEFT SIDE | Performed by: STUDENT IN AN ORGANIZED HEALTH CARE EDUCATION/TRAINING PROGRAM

## 2024-06-09 RX ORDER — METHOCARBAMOL 500 MG/1
500 TABLET, FILM COATED ORAL 4 TIMES DAILY
Qty: 20 TABLET | Refills: 0 | Status: SHIPPED | OUTPATIENT
Start: 2024-06-09 | End: 2024-06-14

## 2024-06-09 RX ORDER — IBUPROFEN 600 MG/1
600 TABLET ORAL EVERY 6 HOURS PRN
Qty: 28 TABLET | Refills: 0 | Status: SHIPPED | OUTPATIENT
Start: 2024-06-09 | End: 2024-06-16

## 2024-06-09 ASSESSMENT — LIFESTYLE VARIABLES
EVER HAD A DRINK FIRST THING IN THE MORNING TO STEADY YOUR NERVES TO GET RID OF A HANGOVER: NO
HAVE YOU EVER FELT YOU SHOULD CUT DOWN ON YOUR DRINKING: NO
TOTAL SCORE: 0
EVER FELT BAD OR GUILTY ABOUT YOUR DRINKING: NO
HAVE PEOPLE ANNOYED YOU BY CRITICIZING YOUR DRINKING: NO

## 2024-06-09 ASSESSMENT — PAIN SCALES - GENERAL
PAINLEVEL_OUTOF10: 0 - NO PAIN
PAINLEVEL_OUTOF10: 0 - NO PAIN

## 2024-06-09 ASSESSMENT — COLUMBIA-SUICIDE SEVERITY RATING SCALE - C-SSRS
6. HAVE YOU EVER DONE ANYTHING, STARTED TO DO ANYTHING, OR PREPARED TO DO ANYTHING TO END YOUR LIFE?: NO
2. HAVE YOU ACTUALLY HAD ANY THOUGHTS OF KILLING YOURSELF?: NO
1. IN THE PAST MONTH, HAVE YOU WISHED YOU WERE DEAD OR WISHED YOU COULD GO TO SLEEP AND NOT WAKE UP?: NO

## 2024-06-09 ASSESSMENT — PAIN DESCRIPTION - PROGRESSION: CLINICAL_PROGRESSION: NOT CHANGED

## 2024-06-09 ASSESSMENT — PAIN - FUNCTIONAL ASSESSMENT: PAIN_FUNCTIONAL_ASSESSMENT: 0-10
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
## 2024-06-09 NOTE — Clinical Note
Román Gómez was seen and treated in our emergency department on 6/9/2024.  He may return to work on 06/12/2024.       If you have any questions or concerns, please don't hesitate to call.      Kyleigh Mccarthy MD

## 2024-06-12 ENCOUNTER — OFFICE VISIT (OUTPATIENT)
Dept: ORTHOPEDIC SURGERY | Facility: CLINIC | Age: 39
End: 2024-06-12
Payer: COMMERCIAL

## 2024-06-12 VITALS — HEIGHT: 67 IN | WEIGHT: 260 LBS | BODY MASS INDEX: 40.81 KG/M2

## 2024-06-12 DIAGNOSIS — S43.015A CLOSED ANTERIOR DISLOCATION OF LEFT SHOULDER, INITIAL ENCOUNTER: Primary | ICD-10-CM

## 2024-06-12 PROCEDURE — 99214 OFFICE O/P EST MOD 30 MIN: CPT | Performed by: ORTHOPAEDIC SURGERY

## 2024-06-12 PROCEDURE — 99204 OFFICE O/P NEW MOD 45 MIN: CPT | Performed by: ORTHOPAEDIC SURGERY

## 2024-06-12 ASSESSMENT — PAIN DESCRIPTION - DESCRIPTORS: DESCRIPTORS: ACHING

## 2024-06-12 ASSESSMENT — PAIN SCALES - GENERAL: PAINLEVEL_OUTOF10: 8

## 2024-06-12 ASSESSMENT — PAIN - FUNCTIONAL ASSESSMENT: PAIN_FUNCTIONAL_ASSESSMENT: 0-10

## 2024-06-12 NOTE — PROGRESS NOTES
39-year-old male with a history of multiple shoulder injuries including instability presents 3 days out from additional instability event for the first time in about 20 years for him.  No previous surgeries.  He fell and dislocated his left shoulder per his report.  If he did in fact dislocated it likely spontaneously reduced.  His emergency room x-rays demonstrate that it is concentrically reduced on AP and axillary views.  He has shoulder pain particularly forward flexion and external rotation today.  He is in a sling.  He works as a .    The patient does not endorse fevers and chills. The patient does not endorse any change in her vision or hearing. They do not endorse chest pain, shortness of breath. The patient does not endorse any abdominal discomfort. They do not endorse any skin irritation or lesions. They do not endorse any new numbness and tingling or as otherwise stated in the history of present illness.    He is in no acute distress, alert and oriented x 3.    Mood and affect are appropriate.    Respirations are unlabored.    Distal limb is pink and well perfused.    Left upper extremity evaluation demonstrates pain to palpation over the anterior lateral shoulder.  Pain with forward flexion past 90 degrees and pain with motion when returning back to a resting position from elevation.  Pain with external rotation and extension.  Positive apprehension test anteriorly.  Sensation is intact to light touch in the axillary, median, ulnar, and radial nerve distributions distally. The hand is warm and well-perfused.    Multiple views of the shoulder from the time of the injury on 6/9 were reviewed.  He has some sclerosis in his inferior glenoid, likely previous [place] type injury.  He also has some possible acute fracture lines in the greater tuberosity but it is not clear.    39-year-old male with an acute left shoulder injury with a history of instability from the remote past.  I like to get a CT  scan to further evaluate the bony structure of his glenoid as well as the possibility of a greater tuberosity fracture.  I will obtain an MRI as well to evaluate his rotator cuff given his weakness today and inability to fully range his shoulder on exam and his history of shoulder injury.  I will have him follow-up with Dr. Cabello to evaluate and manage his injury.    Natural History reviewed. All questions answered. The patient was in agreement with the plan.      **This note was created using voice recognition software and was not corrected for typographical or grammatical errors.**

## 2024-06-18 ENCOUNTER — HOSPITAL ENCOUNTER (OUTPATIENT)
Dept: RADIOLOGY | Facility: CLINIC | Age: 39
Discharge: HOME | End: 2024-06-18
Payer: COMMERCIAL

## 2024-06-18 ENCOUNTER — APPOINTMENT (OUTPATIENT)
Dept: ORTHOPEDIC SURGERY | Facility: CLINIC | Age: 39
End: 2024-06-18
Payer: COMMERCIAL

## 2024-06-18 DIAGNOSIS — S43.015A CLOSED ANTERIOR DISLOCATION OF LEFT SHOULDER, INITIAL ENCOUNTER: ICD-10-CM

## 2024-06-18 PROCEDURE — 73200 CT UPPER EXTREMITY W/O DYE: CPT | Mod: LT

## 2024-06-18 PROCEDURE — 73200 CT UPPER EXTREMITY W/O DYE: CPT | Mod: LEFT SIDE | Performed by: RADIOLOGY

## 2024-06-24 ENCOUNTER — HOSPITAL ENCOUNTER (OUTPATIENT)
Dept: RADIOLOGY | Facility: HOSPITAL | Age: 39
Discharge: HOME | End: 2024-06-24
Payer: COMMERCIAL

## 2024-06-24 PROCEDURE — 73221 MRI JOINT UPR EXTREM W/O DYE: CPT | Mod: LEFT SIDE | Performed by: RADIOLOGY

## 2024-06-24 PROCEDURE — 73221 MRI JOINT UPR EXTREM W/O DYE: CPT | Mod: LT

## 2025-01-02 DIAGNOSIS — I10 ESSENTIAL (PRIMARY) HYPERTENSION: ICD-10-CM

## 2025-01-02 DIAGNOSIS — L21.9 SEBORRHEIC DERMATITIS: ICD-10-CM

## 2025-01-02 RX ORDER — METOPROLOL SUCCINATE 50 MG/1
50 TABLET, EXTENDED RELEASE ORAL DAILY
Qty: 90 TABLET | Refills: 3 | Status: SHIPPED | OUTPATIENT
Start: 2025-01-02

## 2025-01-02 RX ORDER — CLOBETASOL PROPIONATE 0.5 MG/G
CREAM TOPICAL
Qty: 120 G | Refills: 1 | OUTPATIENT
Start: 2025-01-02

## 2025-01-20 ENCOUNTER — HOSPITAL ENCOUNTER (EMERGENCY)
Facility: HOSPITAL | Age: 40
Discharge: HOME | End: 2025-01-20
Payer: COMMERCIAL

## 2025-01-20 VITALS
DIASTOLIC BLOOD PRESSURE: 77 MMHG | HEART RATE: 85 BPM | WEIGHT: 280 LBS | RESPIRATION RATE: 20 BRPM | OXYGEN SATURATION: 96 % | HEIGHT: 68 IN | SYSTOLIC BLOOD PRESSURE: 144 MMHG | BODY MASS INDEX: 42.44 KG/M2 | TEMPERATURE: 98.3 F
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
## 2025-01-20 PROCEDURE — 99283 EMERGENCY DEPT VISIT LOW MDM: CPT

## 2025-01-20 PROCEDURE — 99284 EMERGENCY DEPT VISIT MOD MDM: CPT

## 2025-01-20 RX ORDER — ACETAMINOPHEN 500 MG
1000 TABLET ORAL EVERY 6 HOURS PRN
Qty: 28 TABLET | Refills: 0 | Status: SHIPPED | OUTPATIENT
Start: 2025-01-20 | End: 2025-01-27

## 2025-01-20 RX ORDER — METHOCARBAMOL 500 MG/1
500 TABLET, FILM COATED ORAL EVERY 12 HOURS PRN
Qty: 20 TABLET | Refills: 0 | Status: SHIPPED | OUTPATIENT
Start: 2025-01-20 | End: 2025-01-30

## 2025-01-20 RX ORDER — IBUPROFEN 600 MG/1
600 TABLET ORAL EVERY 6 HOURS PRN
Qty: 28 TABLET | Refills: 0 | Status: SHIPPED | OUTPATIENT
Start: 2025-01-20 | End: 2025-01-27

## 2025-01-20 ASSESSMENT — LIFESTYLE VARIABLES
EVER HAD A DRINK FIRST THING IN THE MORNING TO STEADY YOUR NERVES TO GET RID OF A HANGOVER: NO
HAVE PEOPLE ANNOYED YOU BY CRITICIZING YOUR DRINKING: NO
TOTAL SCORE: 0
HAVE YOU EVER FELT YOU SHOULD CUT DOWN ON YOUR DRINKING: NO
EVER FELT BAD OR GUILTY ABOUT YOUR DRINKING: NO

## 2025-01-20 ASSESSMENT — PAIN DESCRIPTION - LOCATION: LOCATION: OTHER (COMMENT)

## 2025-01-20 ASSESSMENT — PAIN SCALES - GENERAL: PAINLEVEL_OUTOF10: 8

## 2025-01-20 ASSESSMENT — PAIN - FUNCTIONAL ASSESSMENT: PAIN_FUNCTIONAL_ASSESSMENT: 0-10

## 2025-01-20 NOTE — Clinical Note
Román Gómez was seen and treated in our emergency department on 1/20/2025.  He may return to work on 01/23/2025.       If you have any questions or concerns, please don't hesitate to call.      Taylor Mcgee PA-C
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
## 2025-02-08 DIAGNOSIS — I10 HYPERTENSION, UNSPECIFIED TYPE: ICD-10-CM

## 2025-02-10 RX ORDER — LOSARTAN POTASSIUM 100 MG/1
100 TABLET ORAL DAILY
Qty: 30 TABLET | Refills: 0 | OUTPATIENT
Start: 2025-02-10